# Patient Record
Sex: FEMALE | Race: BLACK OR AFRICAN AMERICAN | NOT HISPANIC OR LATINO | Employment: FULL TIME | URBAN - METROPOLITAN AREA
[De-identification: names, ages, dates, MRNs, and addresses within clinical notes are randomized per-mention and may not be internally consistent; named-entity substitution may affect disease eponyms.]

---

## 2021-02-09 PROBLEM — J30.89 ALLERGIC FUNGAL SINUSITIS: Status: ACTIVE | Noted: 2021-02-09

## 2021-02-09 PROBLEM — B49 ALLERGIC FUNGAL SINUSITIS: Status: ACTIVE | Noted: 2021-02-09

## 2021-03-19 PROBLEM — Z98.890 STATUS POST FUNCTIONAL ENDOSCOPIC SINUS SURGERY (FESS): Status: ACTIVE | Noted: 2021-03-19

## 2021-07-17 PROBLEM — K21.9 GASTROESOPHAGEAL REFLUX DISEASE: Status: ACTIVE | Noted: 2021-07-17

## 2021-08-21 PROBLEM — J30.1 SEASONAL ALLERGIC RHINITIS DUE TO POLLEN: Status: ACTIVE | Noted: 2021-08-21

## 2021-08-21 PROBLEM — J30.89 ALLERGIC RHINITIS CAUSED BY MOLD: Status: ACTIVE | Noted: 2021-08-21

## 2021-08-21 PROBLEM — J33.9 NASAL POLYPOSIS: Status: ACTIVE | Noted: 2021-08-21

## 2022-08-19 PROBLEM — J32.9 CHRONIC RHINOSINUSITIS: Status: ACTIVE | Noted: 2022-08-19

## 2022-11-23 ENCOUNTER — SPECIALTY PHARMACY (OUTPATIENT)
Dept: PHARMACY | Facility: CLINIC | Age: 37
End: 2022-11-23

## 2022-11-29 NOTE — TELEPHONE ENCOUNTER
Shirley Forman Key: [id number] - PA Case ID: [id number]    PA submitted via [facility] on 11/29    Marcia, this is Daniel Arrington with [facility].  We are working on your prescription that your doctor has sent us. We will be working with your insurance to get this approved for you. We will be calling you along the way with updates on your medication.  If you have any questions, you can reach us at [phone].    Welcome call outcome: No answer/Unable to leave voicemail

## 2022-11-30 NOTE — TELEPHONE ENCOUNTER
PA approved for Dupixent    Tracking ID: [id number]    Dates approved: 11/29/2022 to 05/29/2023    4 mL per 28 days    Awaiting clarification from provider on loading doses before pushing to initial

## 2022-12-02 ENCOUNTER — SPECIALTY PHARMACY (OUTPATIENT)
Dept: PHARMACY | Facility: CLINIC | Age: 37
End: 2022-12-02

## 2022-12-09 NOTE — TELEPHONE ENCOUNTER
3rd attempt: Outgoing call to pt for initial. No answer. Voicemail not set up.    Closing out referral due to unable to contact.

## 2022-12-09 NOTE — TELEPHONE ENCOUNTER
Specialty Pharmacy - Initial Clinical Assessment    Specialty Medication Orders Linked to Encounter      Flowsheet Row Most Recent Value   Medication #1 dupilumab 300 mg/2 mL PnIj (Order#[id number], Rx#[id number])          Refill Questions - Documented Responses      Flowsheet Row Most Recent Value   Patient Availability and HIPAA Verification    Does patient want to proceed with activity? Unable to Reach          We have had multiple attempts to the patient and have been unsuccessful to reach the patient. We will stop reaching out to the patient but in the event that the patient needs the med and contacts us, we will communicate and begin dispensing for the patient. At your next visit with the patient, please review the importance of being in contact with our specialty pharmacy as a part of our care team.      Hilda Hernandez, PharmD  [facility]  [address] [facility] [address]  Phone: [phone]  Fax: [phone]

## 2023-03-24 ENCOUNTER — SPECIALTY PHARMACY (OUTPATIENT)
Dept: PHARMACY | Facility: CLINIC | Age: 38
End: 2023-03-24

## 2023-03-24 NOTE — TELEPHONE ENCOUNTER
Incoming call for pt regarding dupixent. Pt was closed out due to no contact in December of 2022. Routing to [facility] for Dupixent refill if appropriate. Pt is available for a call back anytime Monday.

## 2023-03-28 ENCOUNTER — SPECIALTY PHARMACY (OUTPATIENT)
Dept: PHARMACY | Facility: CLINIC | Age: 38
End: 2023-03-28

## 2023-03-28 DIAGNOSIS — J33.9 NASAL POLYPS: Primary | ICD-10-CM

## 2023-03-28 NOTE — TELEPHONE ENCOUNTER
Specialty Pharmacy - Initial Clinical Assessment    Specialty Medication Orders Linked to Encounter      Flowsheet Row Most Recent Value   Medication #1 dupilumab 300 mg/2 mL PnIj (Order#[id number], Rx#[id number])          Patient Diagnosis   J33.9 - Nasal polyps    Subjective    Shirley Forman is a 38 y.o. female, who is followed by the specialty pharmacy service for management and education.    Recent Encounters       Date Type Provider Description    2023 Specialty Pharmacy Hilda Hernandez, Sheryl Initial Clinical Assessment    2023 Specialty Pharmacy Jacqueline Ortega, Patient Care Assistant     2022 Specialty Pharmacy Hilda Hernandez, Sheryl     2022 Specialty Pharmacy Daniel Arrington PharmD Referral Authorization          Clinical call attempts since last clinical assessment   2022  5:37 PM - Specialty Pharmacy - Clinical Assessment by Daniel Arrington PharmD  2022  3:25 PM - Specialty Pharmacy - Clinical Assessment by Daniel Arrington PharmD     Current Outpatient Medications   Medication Sig    acetaminophen (TYLENOL) 500 MG tablet Take 500 mg by mouth every 6 (six) hours as needed for Pain.    albuterol (VENTOLIN HFA) 90 mcg/actuation inhaler Inhale 2 puffs into the lungs every 6 (six) hours as needed for Wheezing. Rescue    azelastine (ASTELIN) 137 mcg (0.1 %) nasal spray 1 spray (137 mcg total) by Nasal route 2 (two) times daily. (Patient not taking: Reported on 3/24/2023)    budesonide 1 mg/2 mL NbSp SMARTSI Puff(s) Via Nebulizer Twice Daily    dupilumab 300 mg/2 mL PnIj Inject 300 mg into the skin every 14 (fourteen) days. (Patient not taking: Reported on 3/24/2023)    EPINEPHrine (EPIPEN 2-LAKISHA) 0.3 mg/0.3 mL AtIn Inject 0.3 mLs (0.3 mg total) into the muscle once. for 1 dose    fluticasone propionate (XHANCE) 93 mcg/actuation AerB 93 mcg by Nasal route 2 (two) times daily.    NEILMED SINUS RINSE COMPLETE pkdv use as directed    ondansetron (ZOFRAN) 4 MG tablet Take 1 tablet (4 mg  total) by mouth every 6 (six) hours as needed for Nausea.    ondansetron (ZOFRAN) 4 MG tablet Take 1 tablet (4 mg total) by mouth 2 (two) times daily. (Patient not taking: Reported on 3/24/2023)    sod chlor-bicarb-squeez bottle pkdv 1 packet by sinus irrigation route 2 (two) times a day.    sod chlor-bicarb-squeez bottle pkdv USE AS DIRECTED   Last reviewed on 3/28/2023 12:04 PM by Hilda Hernandez, PharmD    Review of patient's allergies indicates:  No Known AllergiesLast reviewed on  3/28/2023 12:02 PM by Hilda Hernandez          Assessment Questions - Documented Responses      Flowsheet Row Most Recent Value   Assessment    Medication Reconciliation completed for patient Yes   During the past 4 weeks, has patient missed any activities due to condition or medication? No   During the past 4 weeks, did patient have any of the following urgent care visits? None   Goals of Therapy Status Discussed (new start)   Status of the patients ability to self-administer: Is Able   All education points have been covered with patient? Yes, supplemental printed education provided   Welcome packet contents reviewed and discussed with patient? Yes   Assesment completed? Yes   Plan Therapy being initiated   Do you need to open a clinical intervention (i-vent)? No   Do you want to schedule first shipment? Yes   Medication #1 Assessment Info    Patient status New medication, New to [facility]   Is this medication appropriate for the patient? Yes   Is this medication effective? Not yet started          Refill Questions - Documented Responses      Flowsheet Row Most Recent Value   Refill Screening Questions    When does the patient need to receive the medication? 03/31/23   Refill Delivery Questions    How will the patient receive the medication? Mail   When does the patient need to receive the medication? 03/31/23   Shipping Address Home   Address in [facility] confirmed and updated if neccessary? Yes   Expected Copay ($) 0   Is the  "patient able to afford the medication copay? Yes   Payment Method zero copay   Days supply of Refill 28   Supplies needed? No supplies needed   Refill activity completed? Yes   Refill activity plan Refill scheduled   Shipment/Pickup Date: 03/29/23            Objective    She has a past medical history of Asthma, Migraine headache, Nasal polyps, and PONV (postoperative nausea and vomiting).    Tried/failed medications: Budesonide rinse, Zyrtec, Astelin, Xhance, Flonase    BP Readings from Last 4 Encounters:   03/09/23 (!) 150/97   02/09/23 130/82   01/25/23 136/84   01/12/23 (!) 138/91     Ht Readings from Last 4 Encounters:   03/24/23 5' 11" (1.803 m)   03/09/23 5' 11" (1.803 m)   02/09/23 5' 11" (1.803 m)   01/25/23 5' 11" (1.803 m)     Wt Readings from Last 4 Encounters:   03/24/23 90.1 kg (198 lb 10.2 oz)   03/09/23 88.7 kg (195 lb 9.6 oz)   02/09/23 86.2 kg (190 lb)   01/25/23 89 kg (196 lb 3.2 oz)       The goals of prescribed drug therapy management include:  Supporting patient to meet the prescriber's medical treatment objectives  Improving or maintaining quality of life  Maintaining optimal therapy adherence  Minimizing and managing side effects      Goals of Therapy Status: Discussed (new start)    Assessment/Plan  Patient plans to start therapy on 03/31/23      Indication, dosage, appropriateness, effectiveness, safety and convenience of her specialty medication(s) were reviewed today.     Patient Education   Patient received education on the following:   Expectations and possible outcomes of therapy  Proper use, timely administration, and missed dose management  Duration of therapy  Side effects, including prevention, minimization, and management  Contraindications and safety precautions  New or changed medications, including prescribe and over the counter medications and supplements  Reviews recommended vaccinations, as appropriate  Storage, safe handling, and disposal        Tasks added this encounter "   No tasks added.   Tasks due within next 3 months   12/1/2022 - Clinical - Initial Assessment     Hilda Hernandez, PharmD  [facility]  [address]  [facility] [address]  Phone: [phone]  Fax: [phone]

## 2023-04-21 ENCOUNTER — SPECIALTY PHARMACY (OUTPATIENT)
Dept: PHARMACY | Facility: CLINIC | Age: 38
End: 2023-04-21

## 2023-04-21 NOTE — TELEPHONE ENCOUNTER
Specialty Pharmacy - Refill Coordination    Specialty Medication Orders Linked to Encounter      Flowsheet Row Most Recent Value   Medication #1 dupilumab 300 mg/2 mL PnIj (Order#[id number], Rx#[id number])            Refill Questions - Documented Responses      Flowsheet Row Most Recent Value   Patient Availability and HIPAA Verification    Does patient want to proceed with activity? Yes   HIPAA/medical authority confirmed? Yes   Relationship to patient of person spoken to? Self   Refill Screening Questions    Changes to allergies? No   Changes to medications? No   New conditions since last clinic visit? No   Unplanned office visit, urgent care, ED, or hospital admission in the last 4 weeks? No   How does patient/caregiver feel medication is working? Good   Financial problems or insurance changes? No   How many doses of your specialty medications were missed in the last 4 weeks? 0   Would patient like to speak to a pharmacist? No   When does the patient need to receive the medication? 04/28/23   Refill Delivery Questions    How will the patient receive the medication? Mail   When does the patient need to receive the medication? 04/28/23   Shipping Address Home   Address in [facility] confirmed and updated if neccessary? Yes   Expected Copay ($) 0   Is the patient able to afford the medication copay? Yes   Payment Method zero copay   Days supply of Refill 28   Supplies needed? No supplies needed   Refill activity completed? Yes   Refill activity plan Refill scheduled   Shipment/Pickup Date: 04/25/23            Current Outpatient Medications   Medication Sig    acetaminophen (TYLENOL) 500 MG tablet Take 500 mg by mouth every 6 (six) hours as needed for Pain.    albuterol (VENTOLIN HFA) 90 mcg/actuation inhaler Inhale 2 puffs into the lungs every 6 (six) hours as needed for Wheezing. Rescue    azelastine (ASTELIN) 137 mcg (0.1 %) nasal spray 1 spray (137 mcg total) by Nasal route 2 (two) times daily. (Patient  not taking: Reported on 3/24/2023)    budesonide 1 mg/2 mL NbSp SMARTSI Puff(s) Via Nebulizer Twice Daily    dupilumab 300 mg/2 mL PnIj Inject 300 mg into the skin every 14 (fourteen) days. (Patient not taking: Reported on 3/24/2023)    EPINEPHrine (EPIPEN 2-LAKISHA) 0.3 mg/0.3 mL AtIn Inject 0.3 mLs (0.3 mg total) into the muscle once. for 1 dose    fluticasone propionate (XHANCE) 93 mcg/actuation AerB 93 mcg by Nasal route 2 (two) times daily.    NEILMED SINUS RINSE COMPLETE pkdv use as directed    ondansetron (ZOFRAN) 4 MG tablet Take 1 tablet (4 mg total) by mouth every 6 (six) hours as needed for Nausea.    ondansetron (ZOFRAN) 4 MG tablet Take 1 tablet (4 mg total) by mouth 2 (two) times daily. (Patient not taking: Reported on 3/24/2023)    sod chlor-bicarb-squeez bottle pkdv 1 packet by sinus irrigation route 2 (two) times a day.    sod chlor-bicarb-squeez bottle pkdv USE AS DIRECTED   Last reviewed on 4/3/2023  9:21 PM by Marleni Hernandez MD    Review of patient's allergies indicates:  No Known Allergies Last reviewed on  4/3/2023 9:21 PM by Marleni Hernandez      Tasks added this encounter   2023 - Refill Call (Auto Added)   Tasks due within next 3 months   No tasks due.     Oly Sexton  [facility]  [address] [place] [address]  Phone: [phone]  Fax: [phone]

## 2023-05-16 ENCOUNTER — PATIENT MESSAGE (OUTPATIENT)
Dept: PHARMACY | Facility: CLINIC | Age: 38
End: 2023-05-16

## 2023-05-19 ENCOUNTER — SPECIALTY PHARMACY (OUTPATIENT)
Dept: PHARMACY | Facility: CLINIC | Age: 38
End: 2023-05-19

## 2023-05-19 NOTE — TELEPHONE ENCOUNTER
Outgoing call regarding Dupixent refill. PT stated she is due to inject on 5/25/23. Informed pt on Co pay and before starting with questions pt asked if we can call her back @3:30 to get CC, Pt has stop sign, Routing to assigned pharmacist.

## 2023-05-22 NOTE — TELEPHONE ENCOUNTER
Specialty Pharmacy - Refill Coordination    Specialty Medication Orders Linked to Encounter      Flowsheet Row Most Recent Value   Medication #1 dupilumab 300 mg/2 mL PnIj (Order#[id number], Rx#[id number])            Refill Questions - Documented Responses      Flowsheet Row Most Recent Value   Refill Screening Questions    Changes to allergies? No   Changes to medications? No   New conditions since last clinic visit? No   Unplanned office visit, urgent care, ED, or hospital admission in the last 4 weeks? No   How does patient/caregiver feel medication is working? Good   Financial problems or insurance changes? No   How many doses of your specialty medications were missed in the last 4 weeks? 0   Would patient like to speak to a pharmacist? No   When does the patient need to receive the medication? 23   Refill Delivery Questions    How will the patient receive the medication? Mail   When does the patient need to receive the medication? 23   Address in [facility] confirmed and updated if neccessary? Yes   Expected Copay ($) 3   Is the patient able to afford the medication copay? Yes   Payment Method new CC added to file   Days supply of Refill 28   Supplies needed? No supplies needed   Refill activity completed? Yes   Refill activity plan Refill scheduled   Shipment/Pickup Date: 23            Current Outpatient Medications   Medication Sig    acetaminophen (TYLENOL) 500 MG tablet Take 500 mg by mouth every 6 (six) hours as needed for Pain.    albuterol (VENTOLIN HFA) 90 mcg/actuation inhaler Inhale 2 puffs into the lungs every 6 (six) hours as needed for Wheezing. Rescue    azelastine (ASTELIN) 137 mcg (0.1 %) nasal spray 1 spray (137 mcg total) by Nasal route 2 (two) times daily. (Patient not taking: Reported on 3/24/2023)    budesonide 1 mg/2 mL NbSp SMARTSI Puff(s) Via Nebulizer Twice Daily    dupilumab 300 mg/2 mL PnIj Inject 300 mg into the skin every 14 (fourteen) days. (Patient not  taking: Reported on 3/24/2023)    EPINEPHrine (EPIPEN 2-LAKISHA) 0.3 mg/0.3 mL AtIn Inject 0.3 mLs (0.3 mg total) into the muscle once. for 1 dose    fluticasone propionate (XHANCE) 93 mcg/actuation AerB 93 mcg by Nasal route 2 (two) times daily.    NEILMED SINUS RINSE COMPLETE pkdv use as directed    ondansetron (ZOFRAN) 4 MG tablet Take 1 tablet (4 mg total) by mouth every 6 (six) hours as needed for Nausea.    ondansetron (ZOFRAN) 4 MG tablet Take 1 tablet (4 mg total) by mouth 2 (two) times daily. (Patient not taking: Reported on 3/24/2023)    sod chlor-bicarb-squeez bottle pkdv 1 packet by sinus irrigation route 2 (two) times a day.    sod chlor-bicarb-squeez bottle pkdv USE AS DIRECTED   Last reviewed on 4/3/2023  9:21 PM by Marleni Hernandez MD    Review of patient's allergies indicates:  No Known Allergies Last reviewed on  5/11/2023 4:04 PM by William Cook      Tasks added this encounter   No tasks added.   Tasks due within next 3 months   5/19/2023 - Refill Coordination Outreach (1 time occurrence)     Hilda Hernandez, PharmD  [facility]  [address] [place] [address]  Phone: [phone]  Fax: [phone]

## 2023-06-15 ENCOUNTER — SPECIALTY PHARMACY (OUTPATIENT)
Dept: PHARMACY | Facility: CLINIC | Age: 38
End: 2023-06-15

## 2023-06-15 NOTE — TELEPHONE ENCOUNTER
Specialty Pharmacy - Refill Coordination    Specialty Medication Orders Linked to Encounter      Flowsheet Row Most Recent Value   Medication #1 dupilumab 300 mg/2 mL PnIj (Order#[id number], Rx#[id number])            Refill Questions - Documented Responses      Flowsheet Row Most Recent Value   Refill Screening Questions    Changes to allergies? No   Changes to medications? No   New conditions since last clinic visit? No   Unplanned office visit, urgent care, ED, or hospital admission in the last 4 weeks? No   How does patient/caregiver feel medication is working? Good   Financial problems or insurance changes? No   How many doses of your specialty medications were missed in the last 4 weeks? 0   Would patient like to speak to a pharmacist? No   When does the patient need to receive the medication? 23   Refill Delivery Questions    How will the patient receive the medication? Mail   When does the patient need to receive the medication? 23   Shipping Address Home   Address in [facility] confirmed and updated if neccessary? Yes   Expected Copay ($) 3   Is the patient able to afford the medication copay? Yes   Payment Method CC on file   Days supply of Refill 28   Supplies needed? No supplies needed   Refill activity completed? Yes   Refill activity plan Refill scheduled   Shipment/Pickup Date: 23            Current Outpatient Medications   Medication Sig    acetaminophen (TYLENOL) 500 MG tablet Take 500 mg by mouth every 6 (six) hours as needed for Pain.    albuterol (VENTOLIN HFA) 90 mcg/actuation inhaler Inhale 2 puffs into the lungs every 6 (six) hours as needed for Wheezing. Rescue    azelastine (ASTELIN) 137 mcg (0.1 %) nasal spray 1 spray (137 mcg total) by Nasal route 2 (two) times daily. (Patient not taking: Reported on 3/24/2023)    budesonide 1 mg/2 mL NbSp SMARTSI Puff(s) Via Nebulizer Twice Daily    dupilumab 300 mg/2 mL PnIj Inject 300 mg into the skin every 14 (fourteen) days.  (Patient not taking: Reported on 3/24/2023)    EPINEPHrine (EPIPEN 2-LAKISHA) 0.3 mg/0.3 mL AtIn Inject 0.3 mLs (0.3 mg total) into the muscle once. for 1 dose    fluticasone propionate (XHANCE) 93 mcg/actuation AerB 93 mcg by Nasal route 2 (two) times daily.    NEILMED SINUS RINSE COMPLETE pkdv use as directed    ondansetron (ZOFRAN) 4 MG tablet Take 1 tablet (4 mg total) by mouth every 6 (six) hours as needed for Nausea.    ondansetron (ZOFRAN) 4 MG tablet Take 1 tablet (4 mg total) by mouth 2 (two) times daily. (Patient not taking: Reported on 3/24/2023)    sod chlor-bicarb-squeez bottle pkdv 1 packet by sinus irrigation route 2 (two) times a day.    sod chlor-bicarb-squeez bottle pkdv USE AS DIRECTED   Last reviewed on 5/22/2023 10:09 AM by May Avila RN    Review of patient's allergies indicates:  No Known Allergies Last reviewed on  5/22/2023 10:10 AM by May Avila      Tasks added this encounter   6/14/2024 - Benefits Review (Annual recurrence)   Tasks due within next 3 months   No tasks due.     Hilda Hernandez, PharmD  [facility]  [address] [place] [address]  Phone: [phone]  Fax: [phone]

## 2023-07-11 ENCOUNTER — SPECIALTY PHARMACY (OUTPATIENT)
Dept: PHARMACY | Facility: CLINIC | Age: 38
End: 2023-07-11

## 2023-07-11 NOTE — TELEPHONE ENCOUNTER
Specialty Pharmacy - Refill Coordination    Specialty Medication Orders Linked to Encounter      Flowsheet Row Most Recent Value   Medication #1 dupilumab 300 mg/2 mL PnIj (Order#[id number], Rx#[id number])            Refill Questions - Documented Responses      Flowsheet Row Most Recent Value   Patient Availability and HIPAA Verification    Does patient want to proceed with activity? Yes   HIPAA/medical authority confirmed? Yes   Relationship to patient of person spoken to? Self   Refill Screening Questions    Changes to allergies? No   Changes to medications? No   New conditions since last clinic visit? No   Unplanned office visit, urgent care, ED, or hospital admission in the last 4 weeks? No   How does patient/caregiver feel medication is working? Good   Financial problems or insurance changes? No   How many doses of your specialty medications were missed in the last 4 weeks? 0   Would patient like to speak to a pharmacist? No   When does the patient need to receive the medication? 07/21/23   Refill Delivery Questions    How will the patient receive the medication? Mail   When does the patient need to receive the medication? 07/21/23   Shipping Address Home   Address in [facility] confirmed and updated if neccessary? Yes   Expected Copay ($) 3   Is the patient able to afford the medication copay? Yes   Payment Method CC on file   Days supply of Refill 28   Supplies needed? No supplies needed   Refill activity completed? Yes   Refill activity plan Refill scheduled   Shipment/Pickup Date: 07/12/23            Current Outpatient Medications   Medication Sig    acetaminophen (TYLENOL) 500 MG tablet Take 500 mg by mouth every 6 (six) hours as needed for Pain.    albuterol (VENTOLIN HFA) 90 mcg/actuation inhaler Inhale 2 puffs into the lungs every 6 (six) hours as needed for Wheezing. Rescue    azelastine (ASTELIN) 137 mcg (0.1 %) nasal spray 1 spray (137 mcg total) by Nasal route 2 (two) times daily. (Patient  not taking: Reported on 3/24/2023)    budesonide 1 mg/2 mL NbSp SMARTSI Puff(s) Via Nebulizer Twice Daily    dupilumab 300 mg/2 mL PnIj Inject 300 mg into the skin every 14 (fourteen) days. (Patient not taking: Reported on 3/24/2023)    EPINEPHrine (EPIPEN 2-LAKISHA) 0.3 mg/0.3 mL AtIn Inject 0.3 mLs (0.3 mg total) into the muscle once. for 1 dose    fluticasone propionate (XHANCE) 93 mcg/actuation AerB 93 mcg by Nasal route 2 (two) times daily.    NEILMED SINUS RINSE COMPLETE pkdv use as directed    ondansetron (ZOFRAN) 4 MG tablet Take 1 tablet (4 mg total) by mouth every 6 (six) hours as needed for Nausea.    ondansetron (ZOFRAN) 4 MG tablet Take 1 tablet (4 mg total) by mouth 2 (two) times daily. (Patient not taking: Reported on 3/24/2023)    sod chlor-bicarb-squeez bottle pkdv 1 packet by sinus irrigation route 2 (two) times a day.    sod chlor-bicarb-squeez bottle pkdv USE AS DIRECTED   Last reviewed on 2023 10:09 AM by May Avila RN    Review of patient's allergies indicates:  No Known Allergies Last reviewed on  2023 10:10 AM by May Avila      Tasks added this encounter   No tasks added.   Tasks due within next 3 months   2023 - Refill Coordination Outreach (1 time occurrence)     Kate Thompson [facility]  [address] [place] [address]  Phone: [phone]  Fax: [phone]

## 2023-08-02 ENCOUNTER — SPECIALTY PHARMACY (OUTPATIENT)
Dept: PHARMACY | Facility: CLINIC | Age: 38
End: 2023-08-02

## 2023-08-11 NOTE — TELEPHONE ENCOUNTER
Specialty Pharmacy - Refill Coordination    Specialty Medication Orders Linked to Encounter      Flowsheet Row Most Recent Value   Medication #1 dupilumab 300 mg/2 mL PnIj (Order#[id number], Rx#[id number])            Refill Questions - Documented Responses      Flowsheet Row Most Recent Value   Patient Availability and HIPAA Verification    Does patient want to proceed with activity? Yes   HIPAA/medical authority confirmed? Yes   Relationship to patient of person spoken to? Self   Refill Screening Questions    Changes to allergies? No   Changes to medications? No   New conditions since last clinic visit? No   Unplanned office visit, urgent care, ED, or hospital admission in the last 4 weeks? No   How does patient/caregiver feel medication is working? Good   Financial problems or insurance changes? No   How many doses of your specialty medications were missed in the last 4 weeks? 0   Would patient like to speak to a pharmacist? No   When does the patient need to receive the medication? 23   Refill Delivery Questions    How will the patient receive the medication? Mail   When does the patient need to receive the medication? 23   Shipping Address Home   Address in [facility] confirmed and updated if neccessary? Yes   Expected Copay ($) 3   Is the patient able to afford the medication copay? Yes   Payment Method CC on file   Days supply of Refill 28   Supplies needed? No supplies needed   Refill activity completed? Yes   Refill activity plan Refill scheduled   Shipment/Pickup Date: 08/15/23            Current Outpatient Medications   Medication Sig    acetaminophen (TYLENOL) 500 MG tablet Take 500 mg by mouth every 6 (six) hours as needed for Pain.    albuterol (VENTOLIN HFA) 90 mcg/actuation inhaler Inhale 2 puffs into the lungs every 6 (six) hours as needed for Wheezing. Rescue    budesonide 1 mg/2 mL NbSp SMARTSI Puff(s) Via Nebulizer Twice Daily    dupilumab 300 mg/2 mL PnIj Inject 300 mg into  the skin every 14 (fourteen) days.    EPINEPHrine (EPIPEN) 0.3 mg/0.3 mL AtIn Inject 0.3 mLs (0.3 mg total) into the muscle once for 1 dose    fluticasone propionate (XHANCE) 93 mcg/actuation AerB USe 93 mcg by Nasal route 2 (two) times daily.    NEILMED SINUS RINSE COMPLETE pkdv use as directed    ondansetron (ZOFRAN) 4 MG tablet Take 1 tablet (4 mg total) by mouth every 6 (six) hours as needed for Nausea.    ondansetron (ZOFRAN) 4 MG tablet Take 1 tablet (4 mg total) by mouth 2 (two) times daily.    sod chlor-bicarb-squeez bottle pkdv 1 packet by sinus irrigation route 2 (two) times a day.    sod chlor-bicarb-squeez bottle pkdv USE AS DIRECTED   Last reviewed on 8/10/2023  3:15 PM by Destiny Loyola MA    Review of patient's allergies indicates:  No Known Allergies Last reviewed on  8/10/2023 3:15 PM by Destiny Loyola      Tasks added this encounter   No tasks added.   Tasks due within next 3 months   8/11/2023 - Refill Coordination Outreach (1 time occurrence)     Hilda Hernandez, PharmD  [facility]  [address] [place] [id number]  Phone: [phone]  Fax: [phone]

## 2024-03-19 ENCOUNTER — PATIENT MESSAGE (OUTPATIENT)
Dept: ADMINISTRATIVE | Facility: OTHER | Age: 39
End: 2024-03-19
Payer: MEDICAID

## 2024-03-19 ENCOUNTER — ON-DEMAND VIRTUAL (OUTPATIENT)
Dept: URGENT CARE | Facility: CLINIC | Age: 39
End: 2024-03-19
Payer: MEDICAID

## 2024-03-19 DIAGNOSIS — N39.0 URINARY TRACT INFECTION WITHOUT HEMATURIA, SITE UNSPECIFIED: Primary | ICD-10-CM

## 2024-03-19 PROCEDURE — 99203 OFFICE O/P NEW LOW 30 MIN: CPT | Mod: 95,,,

## 2024-03-19 RX ORDER — NITROFURANTOIN 25; 75 MG/1; MG/1
100 CAPSULE ORAL 2 TIMES DAILY
Qty: 14 CAPSULE | Refills: 0 | Status: SHIPPED | OUTPATIENT
Start: 2024-03-19 | End: 2024-03-26

## 2024-03-19 NOTE — PROGRESS NOTES
Subjective:      Patient ID: Shirley Forman is a 39 y.o. female in car    Vitals:  vitals were not taken for this visit.     Chief Complaint: Urinary Tract Infection      Visit Type: TELE AUDIOVISUAL    Present with the patient at the time of consultation: TELEMED PRESENT WITH PATIENT: None    Past Medical History:   Diagnosis Date    Asthma     Migraine headache     Nasal polyps     PONV (postoperative nausea and vomiting)      Past Surgical History:   Procedure Laterality Date    CERVICAL BIOPSY  W/ LOOP ELECTRODE EXCISION      FUNCTIONAL ENDOSCOPIC SINUS SURGERY (FESS) USING COMPUTER-ASSISTED NAVIGATION Bilateral 2/9/2021    Procedure: FESS, USING COMPUTER-ASSISTED NAVIGATION;  Surgeon: Sonam Quiles MD;  Location: [facility] MAIN OR;  Service: ENT;  Laterality: Bilateral;  Will need the OLYMPUS INSTACLEAR also.    FUNCTIONAL ENDOSCOPIC SINUS SURGERY (FESS) USING COMPUTER-ASSISTED NAVIGATION Bilateral 1/25/2023    Procedure: Revision full house FESS (reviewed by KG 01/13/23 @ 0850);  Surgeon: Michael Estrada MD;  Location: [facility] MAIN OR;  Service: ENT;  Laterality: Bilateral;    TUBAL LIGATION       Review of patient's allergies indicates:  No Known Allergies  Current Outpatient Medications on File Prior to Visit   Medication Sig Dispense Refill    acetaminophen (TYLENOL) 500 MG tablet Take 500 mg by mouth every 6 (six) hours as needed for Pain.      dupilumab (DUPIXENT PEN) 300 mg/2 mL PnIj Inject 300 mg into the skin every 14 (fourteen) days. 4 mL 11    fluticasone propionate (XHANCE) 93 mcg/actuation AerB 1 Inhalation by Nasal route 2 (two) times daily. 16 mL 11    ibuprofen (ADVIL,MOTRIN) 800 MG tablet Take 1 tablet (800 mg total) by mouth every 8 (eight) hours as needed for Pain (cramping). 30 tablet 3    NEILMED SINUS RINSE COMPLETE pkdv use as directed      norethindrone (AYGESTIN) 5 mg Tab 1 tablet tid for the first 21 days of each month 63 tablet 11    ondansetron (ZOFRAN) 4 MG tablet Take 1 tablet (4 mg  total) by mouth every 6 (six) hours. 12 tablet 0    sod chlor-bicarb-squeez bottle pkdv 1 packet by sinus irrigation route 2 (two) times a day. 100 each 3    sod chlor-bicarb-squeez bottle pkdv USE AS DIRECTED      tranexamic acid (LYSTEDA) 650 mg tablet 2 tablets by mouth three times daily for 5 days.  Start with first day of each menstrual cycle 30 tablet 11     No current facility-administered medications on file prior to visit.     Family History   Problem Relation Age of Onset    Stroke Mother     Thyroid disease Mother     Diabetes Father     Hypertension Father        Medications Ordered                [id number] - [place], [place] - [address] AT SEC OF [facility] &   [address], [facility] [address]    Telephone: [phone]   Fax: [phone]   Hours: Not open 24 hours                         E-Prescribed (1 of 1)              nitrofurantoin, macrocrystal-monohydrate, (MACROBID) 100 MG capsule    Sig: Take 1 capsule (100 mg total) by mouth 2 (two) times daily. for 7 days       Start: 3/19/24     Quantity: 14 capsule Refills: 0                           Ohs Peq Odvv Intake    3/19/2024  1:54 PM CDT - Filed by Patient   What is your current physical address in the event of a medical emergency? [address] [place] [id number]   Are you able to take your vital signs? No   Please attach any relevant images or files          Patient states that for the past several days she has been having urinary frequency, urgency and pain. Patient denies any chills, fever, abdominal pain or flank pain. Patient states that she does have some mild pressure after urination. Patient unable to tell if blood in urine due to period. Patient states that her last UTI was about one year ago.         Constitution: Negative.   HENT: Negative.     Neck: neck negative.   Cardiovascular: Negative.    Eyes: Negative.    Respiratory: Negative.     Gastrointestinal: Negative.    Endocrine:  negative.   Genitourinary:  Positive for dysuria, frequency and urgency.   Musculoskeletal: Negative.    Skin: Negative.    Allergic/Immunologic: Negative.    Neurological: Negative.    Hematologic/Lymphatic: Negative.    Psychiatric/Behavioral: Negative.          Objective:   The physical exam was conducted virtually.  Physical Exam   Constitutional: She is oriented to person, place, and time.   HENT:   Head: Normocephalic and atraumatic.   Nose: Nose normal.   Eyes: Conjunctivae are normal. Pupils are equal, round, and reactive to light. Extraocular movement intact   Neck: Neck supple.   Pulmonary/Chest: Effort normal.   Abdominal: Normal appearance.   Musculoskeletal: Normal range of motion.         General: Normal range of motion.   Neurological: no focal deficit. She is alert, oriented to person, place, and time and at baseline.   Skin: Skin is warm.   Psychiatric: Her behavior is normal. Mood, judgment and thought content normal.       Assessment:     1. Urinary tract infection without hematuria, site unspecified        Plan:       Urinary tract infection without hematuria, site unspecified  -     nitrofurantoin, macrocrystal-monohydrate, (MACROBID) 100 MG capsule; Take 1 capsule (100 mg total) by mouth 2 (two) times daily. for 7 days  Dispense: 14 capsule; Refill: 0

## 2024-03-19 NOTE — PATIENT INSTRUCTIONS
Recommend increased intake of fluids.    If you were prescribed antibiotics take them as directed to completion.    You may take azo OTC as directed for painful urination unless you were prescribe something for these symptoms by the provider.  Follow-up with PCP or return to clinic if no improvement in symptoms over the next 3 days.  
Adequate: hears normal conversation without difficulty

## 2024-04-08 ENCOUNTER — ON-DEMAND VIRTUAL (OUTPATIENT)
Dept: URGENT CARE | Facility: CLINIC | Age: 39
End: 2024-04-08
Payer: MEDICAID

## 2024-04-08 DIAGNOSIS — B96.89 BACTERIAL SKIN INFECTION: ICD-10-CM

## 2024-04-08 DIAGNOSIS — L23.9 ALLERGIC CONTACT DERMATITIS, UNSPECIFIED TRIGGER: Primary | ICD-10-CM

## 2024-04-08 DIAGNOSIS — L08.9 BACTERIAL SKIN INFECTION: ICD-10-CM

## 2024-04-08 PROCEDURE — 99213 OFFICE O/P EST LOW 20 MIN: CPT | Mod: 95,,,

## 2024-04-08 RX ORDER — PREDNISONE 20 MG/1
20 TABLET ORAL DAILY
Qty: 5 TABLET | Refills: 0 | Status: SHIPPED | OUTPATIENT
Start: 2024-04-08 | End: 2024-04-13

## 2024-04-08 RX ORDER — DOXYCYCLINE 100 MG/1
100 CAPSULE ORAL 2 TIMES DAILY
Qty: 14 CAPSULE | Refills: 0 | Status: SHIPPED | OUTPATIENT
Start: 2024-04-08 | End: 2024-04-15

## 2025-03-25 ENCOUNTER — NURSE TRIAGE (OUTPATIENT)
Dept: ADMINISTRATIVE | Facility: CLINIC | Age: 40
End: 2025-03-25

## 2025-03-26 NOTE — TELEPHONE ENCOUNTER
Pt reports was seen in the ED yesterday for strep throat on antibiotics, but her throat pain is still severe. Pt advised to see a MD within 24 hours per protocol via PCP office/[facility]/ED, Pt encouraged to call back with any worsening symptoms or questions. She verbalized understanding.    Reason for Disposition   [1] Taking antibiotic > 24 hours for strep throat AND [2] sore throat pain is SEVERE    Additional Information   Negative: SEVERE difficulty breathing (e.g., struggling for each breath, speaks in single words, stridor)   Negative: Sounds like a life-threatening emergency to the triager   Negative: [1] Drooling or spitting out saliva (because can't swallow) AND [2] new-onset   Negative: Unable to open mouth completely   Negative: [1] Difficulty breathing AND [2] not severe   Negative: [1] Fever > 103 F (39.4 C) AND [2] taking antibiotic > 24 hours   Negative: [1] Drinking very little AND [2] dehydration suspected (e.g., no urine > 12 hours, very dry mouth, very lightheaded)   Negative: [1] Refuses to drink anything AND [2] for > 12 hours   Negative: Patient sounds very sick or weak to the triager    Protocols used: Strep Throat Infection on Antibiotic Follow-up Call-A-